# Patient Record
Sex: MALE | Race: WHITE | NOT HISPANIC OR LATINO | Employment: FULL TIME | URBAN - METROPOLITAN AREA
[De-identification: names, ages, dates, MRNs, and addresses within clinical notes are randomized per-mention and may not be internally consistent; named-entity substitution may affect disease eponyms.]

---

## 2023-07-17 LAB — TESTOSTERONE (NG/DL) IN SER/PLAS: 770 NG/DL (ref 240–1000)

## 2023-09-22 PROBLEM — N52.9 MALE ERECTILE DISORDER: Status: ACTIVE | Noted: 2023-09-22

## 2023-09-22 PROBLEM — R53.83 FATIGUE: Status: ACTIVE | Noted: 2023-09-22

## 2023-09-22 PROBLEM — F43.22 ADJUSTMENT DISORDER WITH ANXIOUS MOOD: Status: ACTIVE | Noted: 2023-09-22

## 2023-09-22 PROBLEM — F52.32 DELAYED EJACULATION: Status: ACTIVE | Noted: 2023-09-22

## 2023-09-22 RX ORDER — SILDENAFIL CITRATE 20 MG/1
1-5 TABLET ORAL
COMMUNITY
End: 2024-03-07 | Stop reason: WASHOUT

## 2023-09-22 RX ORDER — LISINOPRIL 10 MG/1
1 TABLET ORAL DAILY
COMMUNITY

## 2023-09-22 RX ORDER — TADALAFIL 5 MG/1
1 TABLET ORAL DAILY
COMMUNITY
Start: 2022-06-01 | End: 2024-03-07 | Stop reason: SDUPTHER

## 2023-09-22 RX ORDER — METHYLPHENIDATE HYDROCHLORIDE 36 MG/1
1 TABLET ORAL DAILY
COMMUNITY

## 2023-10-02 ENCOUNTER — APPOINTMENT (OUTPATIENT)
Dept: UROLOGY | Facility: CLINIC | Age: 63
End: 2023-10-02
Payer: COMMERCIAL

## 2023-10-23 ENCOUNTER — OFFICE VISIT (OUTPATIENT)
Dept: UROLOGY | Facility: HOSPITAL | Age: 63
End: 2023-10-23
Payer: COMMERCIAL

## 2023-10-23 DIAGNOSIS — N52.9 MALE ERECTILE DISORDER: Primary | ICD-10-CM

## 2023-10-23 PROCEDURE — 1036F TOBACCO NON-USER: CPT | Performed by: UROLOGY

## 2023-10-23 PROCEDURE — 99213 OFFICE O/P EST LOW 20 MIN: CPT | Performed by: UROLOGY

## 2023-10-23 ASSESSMENT — PAIN SCALES - GENERAL: PAINLEVEL: 0-NO PAIN

## 2023-10-23 NOTE — PROGRESS NOTES
FUV    Last seen - 7/12/23     HISTORY OF PRESENT ILLNESS:   Adelso Egan is a 63 y.o. male who is being seen today for FUV.     Since last visit pt was found to have bleeding gastric ulcer, [facility].  Feeling better, off baby aspirin.      PDE5i not working great.  Mixed results.      T levels normal    Has been working with Dr. Francois    PAST MEDICAL HISTORY:  Past Medical History:   Diagnosis Date    Other specified health status     No pertinent past medical history     PAST SURGICAL HISTORY:  Past Surgical History:   Procedure Laterality Date    OTHER SURGICAL HISTORY  12/08/2021    Shoulder surgery    OTHER SURGICAL HISTORY  12/08/2021    Vertebral fusion     ALLERGIES:   No Known Allergies     MEDICATIONS:   Current Outpatient Medications   Medication Instructions    lisinopril 10 mg tablet 1 tablet, oral, Daily, As directed    methylphenidate (Concerta) 36 mg daily tablet 1 tablet, oral, Daily    sildenafil (Revatio) 20 mg tablet 1-5 tablets, oral, 1 hour prior to sexual activity as needed    tadalafil (Cialis) 5 mg tablet 1 tablet, oral, Daily      PHYSICAL EXAM:  There were no vitals taken for this visit.  Constitutional: Patient appears well-developed and well-nourished. No distress.    Pulmonary/Chest: Effort normal. No respiratory distress.   Musculoskeletal: Normal range of motion.    Neurological: Alert and oriented to person, place, and time.  Psychiatric: Normal mood and affect. Behavior is normal. Thought content normal.      Labs  Lab Results   Component Value Date    TESTOSTERONE 770 07/17/2023    TESTOSTERONE 732 12/08/2021     Imaging    Procedures      Assessment:      1. Male erectile disorder          Adelso Egan is a 63 y.o. male here for FUV     Plan:   1)  Cont PDE5i  2) Cont wokring with Dr. Francois  3) Discussed his overall health and how it can affect his erections    FU in 6m, sooner if needed

## 2024-03-07 ENCOUNTER — OFFICE VISIT (OUTPATIENT)
Dept: UROLOGY | Facility: HOSPITAL | Age: 64
End: 2024-03-07
Payer: COMMERCIAL

## 2024-03-07 DIAGNOSIS — N52.9 ERECTILE DYSFUNCTION, UNSPECIFIED ERECTILE DYSFUNCTION TYPE: Primary | ICD-10-CM

## 2024-03-07 DIAGNOSIS — N52.9 MALE ERECTILE DISORDER: ICD-10-CM

## 2024-03-07 DIAGNOSIS — R39.9 LOWER URINARY TRACT SYMPTOMS (LUTS): ICD-10-CM

## 2024-03-07 DIAGNOSIS — Z12.5 PROSTATE CANCER SCREENING: ICD-10-CM

## 2024-03-07 PROCEDURE — 99214 OFFICE O/P EST MOD 30 MIN: CPT | Performed by: UROLOGY

## 2024-03-07 PROCEDURE — 1036F TOBACCO NON-USER: CPT | Performed by: UROLOGY

## 2024-03-07 RX ORDER — TADALAFIL 5 MG/1
5 TABLET ORAL DAILY
Qty: 90 TABLET | Refills: 3 | Status: SHIPPED | OUTPATIENT
Start: 2024-03-07 | End: 2025-03-07

## 2024-03-07 RX ORDER — SILDENAFIL 100 MG/1
100 TABLET, FILM COATED ORAL AS NEEDED
Qty: 30 TABLET | Refills: 11 | Status: SHIPPED | OUTPATIENT
Start: 2024-03-07 | End: 2024-03-12 | Stop reason: SDUPTHER

## 2024-03-07 ASSESSMENT — PAIN SCALES - GENERAL: PAINLEVEL: 0-NO PAIN

## 2024-03-07 NOTE — PROGRESS NOTES
FUV    Last seen - 10/23/23     HISTORY OF PRESENT ILLNESS:   Adelso Egan is a 63 y.o. male who is being seen today for FUV.     ED - PDE5i, Mixed results. Using about 80mg of sildenafil and daily tadalafil.  Minimal ejaculate.    Has been working with Dr. Francois    PSA 3.58, 5.03    LUTS - Voiding more at night    PAST MEDICAL HISTORY:  Past Medical History:   Diagnosis Date    Other specified health status     No pertinent past medical history     PAST SURGICAL HISTORY:  Past Surgical History:   Procedure Laterality Date    OTHER SURGICAL HISTORY  12/08/2021    Shoulder surgery    OTHER SURGICAL HISTORY  12/08/2021    Vertebral fusion     ALLERGIES:   No Known Allergies     MEDICATIONS:   Current Outpatient Medications   Medication Instructions    lisinopril 10 mg tablet 1 tablet, oral, Daily, As directed    methylphenidate (Concerta) 36 mg daily tablet 1 tablet, oral, Daily    sildenafil (Revatio) 20 mg tablet 1-5 tablets, oral, 1 hour prior to sexual activity as needed    tadalafil (Cialis) 5 mg tablet 1 tablet, oral, Daily      PHYSICAL EXAM:  There were no vitals taken for this visit.  Constitutional: Patient appears well-developed and well-nourished. No distress.    Pulmonary/Chest: Effort normal. No respiratory distress.   Musculoskeletal: Normal range of motion.    Neurological: Alert and oriented to person, place, and time.  Psychiatric: Normal mood and affect. Behavior is normal. Thought content normal.      Labs  Lab Results   Component Value Date    TESTOSTERONE 770 07/17/2023    TESTOSTERONE 732 12/08/2021       Assessment:      1. Erectile dysfunction, unspecified erectile dysfunction type  sildenafil (Viagra) 100 mg tablet    MR prostate screening self pay exam    tadalafil (Cialis) 5 mg tablet      2. Male erectile disorder        3. Prostate cancer screening        4. Lower urinary tract symptoms (LUTS)            Adelso Egan is a 63 y.o. male here for FUV     Plan:   1)  Cont PDE5i's,  refilled  2) PSA slightly elevated, will get MRI  3) Conservative measures discussed    Elevated PSA  Patient presents today for the evaluation of elevated PSA (Prostate Specific Antigen).  We discussed Prostate cancer screening, and that it is recommended for men aged 55-69, but can also start early in high risk patients ( and patients with family history of prostate cancer) and go longer in active, healthy men.  We discussed the screening process involves a digital rectal exam (TOSHIA) and blood test (PSA).      We discussed the role of trans-rectal ultrasound guided prostate biopsy.  I highlighted that it is an office based procedure.  He will be given a dose of antibiotics prior to the procedure.  He was also instructed to give himself an enema the morning of the biopsy.  Risks of the biopsy including pain, blood in the urine, stool and ejaculate which can last a few weeks.  The risk of post prostate biopsy sepsis was discussed and that if signs of infection, such as fevers, chills, lethargy require a prompt evaluation.      I highlighted the role of MRI Prostate in identifying high-risk prostate cancer and images can be used to target those areas with the biopsy.     Lower Urinary Tract Symptoms (LUTS)  I educated the patient on relevant lower urinary tract anatomy and physiology with emphasis on differential diagnosis as well as contributing factors to the patient's lower urinary tract symptoms. I educated the patient on dietary and behavioral modifications pertinent to the patient's complaints. I recommended to avoid caffeine, alcohol, spicy and acidic oral intake and to regulate fluid intake and voiding (timed voiding, double voiding). I stressed the importance of avoiding constipation and recommended stool softeners unless diarrhea present. We discussed limiting fluid intake at night and elevating legs prior to bedtime.     The mechanism of action as well as the risks, benefits, common side  effects, and alternatives to all prescribed medications were discussed with the patient at length. The patient had the opportunity to ask questions and all questions were answered. I primarily discussed alpha blockers, 5ARIs, PDE5i, anticholinergics, and beta 3 agonist (mirabegron).  I explained that alpha blockers help decrease bladder outlet resistance with potential side effects to include retrograde ejaculation, rhinitis, and light headedness. I explained that 5 alpha reductase inhibitors can shrink the prostate up to 30%, can artificially decrease their PSA value by 50%, but take approximately 6-9 months to reach full efficacy and have potential side effects to include decreased libido, impotence and breast tenderness.

## 2024-03-12 DIAGNOSIS — N52.9 ERECTILE DYSFUNCTION, UNSPECIFIED ERECTILE DYSFUNCTION TYPE: ICD-10-CM

## 2024-03-12 RX ORDER — SILDENAFIL 100 MG/1
100 TABLET, FILM COATED ORAL AS NEEDED
Qty: 30 TABLET | Refills: 11 | Status: SHIPPED | OUTPATIENT
Start: 2024-03-12 | End: 2025-03-12

## 2024-04-22 ENCOUNTER — APPOINTMENT (OUTPATIENT)
Dept: UROLOGY | Facility: HOSPITAL | Age: 64
End: 2024-04-22
Payer: COMMERCIAL

## 2024-04-29 ENCOUNTER — APPOINTMENT (OUTPATIENT)
Dept: RADIOLOGY | Facility: HOSPITAL | Age: 64
End: 2024-04-29
Payer: COMMERCIAL

## 2024-05-02 ENCOUNTER — APPOINTMENT (OUTPATIENT)
Dept: UROLOGY | Facility: HOSPITAL | Age: 64
End: 2024-05-02
Payer: COMMERCIAL

## 2024-05-06 ENCOUNTER — HOSPITAL ENCOUNTER (OUTPATIENT)
Dept: RADIOLOGY | Facility: HOSPITAL | Age: 64
Discharge: HOME | End: 2024-05-06
Payer: COMMERCIAL

## 2024-05-06 DIAGNOSIS — N52.9 ERECTILE DYSFUNCTION, UNSPECIFIED ERECTILE DYSFUNCTION TYPE: ICD-10-CM

## 2024-05-06 PROCEDURE — 6100000002 MR PROSTATE SCREENING SELF PAY EXAM

## 2024-09-05 ENCOUNTER — LAB (OUTPATIENT)
Dept: LAB | Facility: LAB | Age: 64
End: 2024-09-05
Payer: MEDICARE

## 2024-09-05 ENCOUNTER — APPOINTMENT (OUTPATIENT)
Dept: UROLOGY | Facility: HOSPITAL | Age: 64
End: 2024-09-05
Payer: COMMERCIAL

## 2024-09-05 DIAGNOSIS — Z12.5 PROSTATE CANCER SCREENING: Primary | ICD-10-CM

## 2024-09-05 DIAGNOSIS — R39.9 LOWER URINARY TRACT SYMPTOMS (LUTS): ICD-10-CM

## 2024-09-05 DIAGNOSIS — N52.9 MALE ERECTILE DISORDER: ICD-10-CM

## 2024-09-05 DIAGNOSIS — Z12.5 PROSTATE CANCER SCREENING: ICD-10-CM

## 2024-09-05 LAB — PSA SERPL-MCNC: 3.59 NG/ML

## 2024-09-05 PROCEDURE — 99214 OFFICE O/P EST MOD 30 MIN: CPT | Performed by: UROLOGY

## 2024-09-05 PROCEDURE — G0103 PSA SCREENING: HCPCS

## 2024-09-05 NOTE — PROGRESS NOTES
FUV    Last seen - 3/7/24     HISTORY OF PRESENT ILLNESS:   Adelso Egan is a 64 y.o. male who is being seen today for FUV.     ED - Using about PRN sildenafil and daily tadalafil.  Working well    LUTS - Voiding more at night    PSA 3.58 (10/16/23)  MRI Prostate (5/6/24) - 27g, PIRADS 2    PAST MEDICAL HISTORY:  Past Medical History:   Diagnosis Date    Other specified health status     No pertinent past medical history     PAST SURGICAL HISTORY:  Past Surgical History:   Procedure Laterality Date    OTHER SURGICAL HISTORY  12/08/2021    Shoulder surgery    OTHER SURGICAL HISTORY  12/08/2021    Vertebral fusion     ALLERGIES:   No Known Allergies     MEDICATIONS:   Current Outpatient Medications   Medication Instructions    lisinopril 10 mg tablet 1 tablet, oral, Daily, As directed    methylphenidate (Concerta) 36 mg daily tablet 1 tablet, oral, Daily    sildenafil (VIAGRA) 100 mg, oral, As needed    tadalafil (CIALIS) 5 mg, oral, Daily      PHYSICAL EXAM:  There were no vitals taken for this visit.  Constitutional: Patient appears well-developed and well-nourished. No distress.    Pulmonary/Chest: Effort normal. No respiratory distress.   Musculoskeletal: Normal range of motion.    Neurological: Alert and oriented to person, place, and time.  Psychiatric: Normal mood and affect. Behavior is normal. Thought content normal.      Labs  Lab Results   Component Value Date    TESTOSTERONE 770 07/17/2023    TESTOSTERONE 732 12/08/2021       Assessment:      1. Prostate cancer screening  PSA      2. Lower urinary tract symptoms (LUTS)        3. Male erectile disorder              Adelso Egan is a 64 y.o. male here for FUV     Plan:   1)  Cont PDE5i's  2) Will check PSA  3) Conservative measures discussed    Elevated PSA  Patient presents today for the evaluation of elevated PSA (Prostate Specific Antigen).  We discussed Prostate cancer screening, and that it is recommended for men aged 55-69, but can also start early  in high risk patients ( and patients with family history of prostate cancer) and go longer in active, healthy men.  We discussed the screening process involves a digital rectal exam (TOSHIA) and blood test (PSA).      We discussed the role of trans-rectal ultrasound guided prostate biopsy.  I highlighted that it is an office based procedure.  He will be given a dose of antibiotics prior to the procedure.  He was also instructed to give himself an enema the morning of the biopsy.  Risks of the biopsy including pain, blood in the urine, stool and ejaculate which can last a few weeks.  The risk of post prostate biopsy sepsis was discussed and that if signs of infection, such as fevers, chills, lethargy require a prompt evaluation.      I highlighted the role of MRI Prostate in identifying high-risk prostate cancer and images can be used to target those areas with the biopsy.     Lower Urinary Tract Symptoms (LUTS)  I educated the patient on relevant lower urinary tract anatomy and physiology with emphasis on differential diagnosis as well as contributing factors to the patient's lower urinary tract symptoms. I educated the patient on dietary and behavioral modifications pertinent to the patient's complaints. I recommended to avoid caffeine, alcohol, spicy and acidic oral intake and to regulate fluid intake and voiding (timed voiding, double voiding). I stressed the importance of avoiding constipation and recommended stool softeners unless diarrhea present. We discussed limiting fluid intake at night and elevating legs prior to bedtime.     The mechanism of action as well as the risks, benefits, common side effects, and alternatives to all prescribed medications were discussed with the patient at length. The patient had the opportunity to ask questions and all questions were answered. I primarily discussed alpha blockers, 5ARIs, PDE5i, anticholinergics, and beta 3 agonist (mirabegron).  I explained that  alpha blockers help decrease bladder outlet resistance with potential side effects to include retrograde ejaculation, rhinitis, and light headedness. I explained that 5 alpha reductase inhibitors can shrink the prostate up to 30%, can artificially decrease their PSA value by 50%, but take approximately 6-9 months to reach full efficacy and have potential side effects to include decreased libido, impotence and breast tenderness.

## 2025-01-13 ENCOUNTER — HOSPITAL ENCOUNTER (OUTPATIENT)
Dept: RADIOLOGY | Facility: CLINIC | Age: 65
End: 2025-01-13
Payer: MEDICARE

## 2025-02-03 DIAGNOSIS — N52.9 ERECTILE DYSFUNCTION, UNSPECIFIED ERECTILE DYSFUNCTION TYPE: ICD-10-CM

## 2025-02-03 RX ORDER — SILDENAFIL 100 MG/1
100 TABLET, FILM COATED ORAL AS NEEDED
Qty: 30 TABLET | Refills: 11 | Status: SHIPPED | OUTPATIENT
Start: 2025-02-03 | End: 2026-02-03

## 2025-02-03 RX ORDER — TADALAFIL 5 MG/1
5 TABLET ORAL DAILY
Qty: 90 TABLET | Refills: 3 | Status: SHIPPED | OUTPATIENT
Start: 2025-02-03 | End: 2026-02-03

## 2025-04-29 ENCOUNTER — APPOINTMENT (OUTPATIENT)
Dept: RADIOLOGY | Facility: CLINIC | Age: 65
End: 2025-04-29
Payer: MEDICARE